# Patient Record
Sex: MALE | Race: WHITE | NOT HISPANIC OR LATINO | Employment: PART TIME | URBAN - METROPOLITAN AREA
[De-identification: names, ages, dates, MRNs, and addresses within clinical notes are randomized per-mention and may not be internally consistent; named-entity substitution may affect disease eponyms.]

---

## 2024-10-09 ENCOUNTER — APPOINTMENT (OUTPATIENT)
Dept: PRIMARY CARE | Facility: CLINIC | Age: 64
End: 2024-10-09
Payer: COMMERCIAL

## 2024-10-09 VITALS — SYSTOLIC BLOOD PRESSURE: 102 MMHG | BODY MASS INDEX: 22.5 KG/M2 | DIASTOLIC BLOOD PRESSURE: 72 MMHG | WEIGHT: 148 LBS

## 2024-10-09 DIAGNOSIS — Z12.11 COLON CANCER SCREENING: ICD-10-CM

## 2024-10-09 DIAGNOSIS — Z00.00 WELL ADULT EXAM: ICD-10-CM

## 2024-10-09 DIAGNOSIS — J45.20 MILD INTERMITTENT ASTHMA WITHOUT COMPLICATION (HHS-HCC): Primary | ICD-10-CM

## 2024-10-09 PROCEDURE — 99396 PREV VISIT EST AGE 40-64: CPT | Performed by: STUDENT IN AN ORGANIZED HEALTH CARE EDUCATION/TRAINING PROGRAM

## 2024-10-09 PROCEDURE — 1036F TOBACCO NON-USER: CPT | Performed by: STUDENT IN AN ORGANIZED HEALTH CARE EDUCATION/TRAINING PROGRAM

## 2024-10-09 ASSESSMENT — PATIENT HEALTH QUESTIONNAIRE - PHQ9
1. LITTLE INTEREST OR PLEASURE IN DOING THINGS: NOT AT ALL
SUM OF ALL RESPONSES TO PHQ9 QUESTIONS 1 AND 2: 0
2. FEELING DOWN, DEPRESSED OR HOPELESS: NOT AT ALL

## 2024-10-09 NOTE — PROGRESS NOTES
Subjective   Patient ID: Skyler Fuentes is a 64 y.o. male who presents for Establish Care.  HPI  Skyler is here to establish care.    States that he is here to establish care. States that he has a dry cough that began in July. The cough has not cleared up. States the there is a bit of chest pain from the chronic cough. Was given albuterol from a visit at [facility] x2. He was given amoxicillin from a [facility] which he finished 1 week ago. Overall in good health, interested in establishing care.    PMHx: asthma, HLD  SurgHx: 2005/2009 inguinal hernial repairs  FamHx: brain cancer (brother), CAD, HTN, HLD, lung cancer (mother)  SocialHx: beer a few a month, marijuana occasionally, no tobacco,  (37 years), 3 adult daughters, works at golf [employer]    Review of Systems  12-point ROS was reviewed and is negative, unless otherwise noted in HPI    Objective   Vitals:    10/09/24 1253   BP: 102/72      Physical Exam  GEN: alert, conversant, NAD  HEENT: PERRL, EOMI, MMM, Tms pearly gray bilaterally  NECK: supple, no LAD appreciated  CHEST: CTAB  CV: S1, S2, RRR, no murmurs appreciated  ABD: soft, NT, ND  EXT: no significant LE edema  SKIN: warm, dry    Assessment/Plan   #Well adult  - basic lab work recommended, patient declined at this time  - patient runs about 6 miles a week, continue exercise and diet  - maintain good sleep hygiene    #Asthma, mild intermittent  #Chronic cough, improved  - prescribed albuterol PRN  - continue flonase    Health Maintenance:  Vaccines: COVID (declined), Flu (UTD), TDAP (UTD, 2023), Shingles (declined), Pneumonia (x1), RSV (advised)  Screening: Colonoscopy (5 years ago, cologuard order)  Labs: Declines     RTC in 12 months, or sooner PRN    Olman ePrez DO, PhD    Trainee role: Resident    I saw and evaluated the patient. I personally obtained the key and critical portions of the history and physical exam or was physically present for key and critical portions performed by the  trainee. I reviewed the trainee's documentation and discussed the patient with the trainee. I agree with the trainee's medical decision making as documented on the trainee's notes.    Davonte Leos, DO

## 2024-11-21 LAB — NONINV COLON CA DNA+OCC BLD SCRN STL QL: NEGATIVE
